# Patient Record
Sex: FEMALE | Employment: OTHER | URBAN - METROPOLITAN AREA
[De-identification: names, ages, dates, MRNs, and addresses within clinical notes are randomized per-mention and may not be internally consistent; named-entity substitution may affect disease eponyms.]

---

## 2023-12-22 ENCOUNTER — APPOINTMENT (OUTPATIENT)
Dept: RADIOLOGY | Facility: HOSPITAL | Age: 73
End: 2023-12-22

## 2023-12-22 ENCOUNTER — HOSPITAL ENCOUNTER (OUTPATIENT)
Facility: HOSPITAL | Age: 73
Setting detail: OBSERVATION
Discharge: HOME | End: 2023-12-23
Attending: STUDENT IN AN ORGANIZED HEALTH CARE EDUCATION/TRAINING PROGRAM | Admitting: INTERNAL MEDICINE

## 2023-12-22 ENCOUNTER — APPOINTMENT (OUTPATIENT)
Dept: CARDIOLOGY | Facility: HOSPITAL | Age: 73
End: 2023-12-22
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
## 2023-12-22 DIAGNOSIS — R55 SYNCOPE, NEAR: ICD-10-CM

## 2023-12-22 DIAGNOSIS — S09.90XA HEAD INJURY, INITIAL ENCOUNTER: ICD-10-CM

## 2023-12-22 DIAGNOSIS — R55 SYNCOPE, UNSPECIFIED SYNCOPE TYPE: ICD-10-CM

## 2023-12-22 LAB
ALBUMIN SERPL-MCNC: 4.2 G/DL (ref 3.5–5)
ALP BLD-CCNC: 67 U/L (ref 35–125)
ALT SERPL-CCNC: 33 U/L (ref 5–40)
ANION GAP SERPL CALC-SCNC: 13 MMOL/L
APPEARANCE UR: CLEAR
AST SERPL-CCNC: 32 U/L (ref 5–40)
ATRIAL RATE: 72 BPM
BACTERIA #/AREA URNS AUTO: ABNORMAL /HPF
BASOPHILS # BLD AUTO: 0.04 X10*3/UL (ref 0–0.1)
BASOPHILS NFR BLD AUTO: 0.6 %
BILIRUB SERPL-MCNC: 0.4 MG/DL (ref 0.1–1.2)
BILIRUB UR STRIP.AUTO-MCNC: NEGATIVE MG/DL
BUN SERPL-MCNC: 37 MG/DL (ref 8–25)
CALCIUM SERPL-MCNC: 10.8 MG/DL (ref 8.5–10.4)
CHLORIDE SERPL-SCNC: 103 MMOL/L (ref 97–107)
CO2 SERPL-SCNC: 25 MMOL/L (ref 24–31)
COLOR UR: ABNORMAL
CREAT SERPL-MCNC: 1.3 MG/DL (ref 0.4–1.6)
EOSINOPHIL # BLD AUTO: 0.12 X10*3/UL (ref 0–0.4)
EOSINOPHIL NFR BLD AUTO: 1.7 %
ERYTHROCYTE [DISTWIDTH] IN BLOOD BY AUTOMATED COUNT: 13.1 % (ref 11.5–14.5)
ETHANOL SERPL-MCNC: <0.01 G/DL
GFR SERPL CREATININE-BSD FRML MDRD: 43 ML/MIN/1.73M*2
GLUCOSE BLD MANUAL STRIP-MCNC: 175 MG/DL (ref 74–99)
GLUCOSE BLD MANUAL STRIP-MCNC: 310 MG/DL (ref 74–99)
GLUCOSE SERPL-MCNC: 231 MG/DL (ref 65–99)
GLUCOSE UR STRIP.AUTO-MCNC: ABNORMAL MG/DL
HCT VFR BLD AUTO: 41.7 % (ref 36–46)
HGB BLD-MCNC: 13.5 G/DL (ref 12–16)
HOLD SPECIMEN: NORMAL
HYALINE CASTS #/AREA URNS AUTO: ABNORMAL /LPF
IMM GRANULOCYTES # BLD AUTO: 0.03 X10*3/UL (ref 0–0.5)
IMM GRANULOCYTES NFR BLD AUTO: 0.4 % (ref 0–0.9)
KETONES UR STRIP.AUTO-MCNC: NEGATIVE MG/DL
LACTATE BLDV-SCNC: 1.9 MMOL/L (ref 0.4–2)
LEUKOCYTE ESTERASE UR QL STRIP.AUTO: NEGATIVE
LYMPHOCYTES # BLD AUTO: 1.99 X10*3/UL (ref 0.8–3)
LYMPHOCYTES NFR BLD AUTO: 28.1 %
MAGNESIUM SERPL-MCNC: 1.9 MG/DL (ref 1.6–3.1)
MCH RBC QN AUTO: 28.2 PG (ref 26–34)
MCHC RBC AUTO-ENTMCNC: 32.4 G/DL (ref 32–36)
MCV RBC AUTO: 87 FL (ref 80–100)
MONOCYTES # BLD AUTO: 0.35 X10*3/UL (ref 0.05–0.8)
MONOCYTES NFR BLD AUTO: 4.9 %
NEUTROPHILS # BLD AUTO: 4.55 X10*3/UL (ref 1.6–5.5)
NEUTROPHILS NFR BLD AUTO: 64.3 %
NITRITE UR QL STRIP.AUTO: NEGATIVE
NRBC BLD-RTO: 0 /100 WBCS (ref 0–0)
P AXIS: 27 DEGREES
P OFFSET: 175 MS
P ONSET: 123 MS
PH UR STRIP.AUTO: 5 [PH]
PLATELET # BLD AUTO: 179 X10*3/UL (ref 150–450)
POTASSIUM SERPL-SCNC: 5.1 MMOL/L (ref 3.4–5.1)
PR INTERVAL: 190 MS
PROT SERPL-MCNC: 6.6 G/DL (ref 5.9–7.9)
PROT UR STRIP.AUTO-MCNC: ABNORMAL MG/DL
Q ONSET: 218 MS
QRS COUNT: 12 BEATS
QRS DURATION: 82 MS
QT INTERVAL: 406 MS
QTC CALCULATION(BAZETT): 444 MS
QTC FREDERICIA: 431 MS
R AXIS: -47 DEGREES
RBC # BLD AUTO: 4.78 X10*6/UL (ref 4–5.2)
RBC # UR STRIP.AUTO: NEGATIVE /UL
RBC #/AREA URNS AUTO: ABNORMAL /HPF
SODIUM SERPL-SCNC: 141 MMOL/L (ref 133–145)
SP GR UR STRIP.AUTO: 1.02
SQUAMOUS #/AREA URNS AUTO: ABNORMAL /HPF
T AXIS: 44 DEGREES
T OFFSET: 421 MS
TROPONIN T SERPL-MCNC: 15 NG/L
TROPONIN T SERPL-MCNC: 16 NG/L
TROPONIN T SERPL-MCNC: 16 NG/L
UROBILINOGEN UR STRIP.AUTO-MCNC: NORMAL MG/DL
VENTRICULAR RATE: 72 BPM
WBC # BLD AUTO: 7.1 X10*3/UL (ref 4.4–11.3)
WBC #/AREA URNS AUTO: ABNORMAL /HPF
WBC CLUMPS #/AREA URNS AUTO: ABNORMAL /HPF

## 2023-12-22 PROCEDURE — 87086 URINE CULTURE/COLONY COUNT: CPT | Mod: WESLAB | Performed by: STUDENT IN AN ORGANIZED HEALTH CARE EDUCATION/TRAINING PROGRAM

## 2023-12-22 PROCEDURE — 99285 EMERGENCY DEPT VISIT HI MDM: CPT | Performed by: STUDENT IN AN ORGANIZED HEALTH CARE EDUCATION/TRAINING PROGRAM

## 2023-12-22 PROCEDURE — 36415 COLL VENOUS BLD VENIPUNCTURE: CPT | Performed by: PHYSICIAN ASSISTANT

## 2023-12-22 PROCEDURE — 85025 COMPLETE CBC W/AUTO DIFF WBC: CPT | Performed by: PHYSICIAN ASSISTANT

## 2023-12-22 PROCEDURE — 83735 ASSAY OF MAGNESIUM: CPT | Performed by: PHYSICIAN ASSISTANT

## 2023-12-22 PROCEDURE — G0378 HOSPITAL OBSERVATION PER HR: HCPCS

## 2023-12-22 PROCEDURE — 82947 ASSAY GLUCOSE BLOOD QUANT: CPT

## 2023-12-22 PROCEDURE — 2500000004 HC RX 250 GENERAL PHARMACY W/ HCPCS (ALT 636 FOR OP/ED): Performed by: PHYSICIAN ASSISTANT

## 2023-12-22 PROCEDURE — 82077 ASSAY SPEC XCP UR&BREATH IA: CPT | Performed by: PHYSICIAN ASSISTANT

## 2023-12-22 PROCEDURE — 84484 ASSAY OF TROPONIN QUANT: CPT | Performed by: PHYSICIAN ASSISTANT

## 2023-12-22 PROCEDURE — 93005 ELECTROCARDIOGRAM TRACING: CPT

## 2023-12-22 PROCEDURE — 72125 CT NECK SPINE W/O DYE: CPT

## 2023-12-22 PROCEDURE — 81001 URINALYSIS AUTO W/SCOPE: CPT | Performed by: STUDENT IN AN ORGANIZED HEALTH CARE EDUCATION/TRAINING PROGRAM

## 2023-12-22 PROCEDURE — 70450 CT HEAD/BRAIN W/O DYE: CPT

## 2023-12-22 PROCEDURE — 83605 ASSAY OF LACTIC ACID: CPT | Performed by: PHYSICIAN ASSISTANT

## 2023-12-22 PROCEDURE — 71046 X-RAY EXAM CHEST 2 VIEWS: CPT

## 2023-12-22 PROCEDURE — 80053 COMPREHEN METABOLIC PANEL: CPT | Performed by: PHYSICIAN ASSISTANT

## 2023-12-22 RX ORDER — CALCIUM CARBONATE 300MG(750)
400 TABLET,CHEWABLE ORAL DAILY
COMMUNITY

## 2023-12-22 RX ORDER — SOLIFENACIN SUCCINATE 10 MG/1
10 TABLET, FILM COATED ORAL DAILY
COMMUNITY

## 2023-12-22 RX ORDER — FENOFIBRATE 48 MG/1
48 TABLET, FILM COATED ORAL DAILY
COMMUNITY

## 2023-12-22 RX ORDER — ONDANSETRON 4 MG/1
4 TABLET, ORALLY DISINTEGRATING ORAL EVERY 8 HOURS PRN
Status: DISCONTINUED | OUTPATIENT
Start: 2023-12-22 | End: 2023-12-23 | Stop reason: HOSPADM

## 2023-12-22 RX ORDER — ACARBOSE 100 MG/1
100 TABLET ORAL
COMMUNITY

## 2023-12-22 RX ORDER — GUAIFENESIN/DEXTROMETHORPHAN 100-10MG/5
5 SYRUP ORAL EVERY 4 HOURS PRN
Status: DISCONTINUED | OUTPATIENT
Start: 2023-12-22 | End: 2023-12-23 | Stop reason: HOSPADM

## 2023-12-22 RX ORDER — ESCITALOPRAM OXALATE 20 MG/1
20 TABLET ORAL DAILY
Status: DISCONTINUED | OUTPATIENT
Start: 2023-12-22 | End: 2023-12-23 | Stop reason: HOSPADM

## 2023-12-22 RX ORDER — DAPAGLIFLOZIN 10 MG/1
10 TABLET, FILM COATED ORAL
COMMUNITY

## 2023-12-22 RX ORDER — METFORMIN HYDROCHLORIDE EXTENDED-RELEASE TABLETS 500 MG/1
500 TABLET, FILM COATED, EXTENDED RELEASE ORAL
COMMUNITY

## 2023-12-22 RX ORDER — ACETAMINOPHEN 650 MG/1
650 SUPPOSITORY RECTAL EVERY 4 HOURS PRN
Status: DISCONTINUED | OUTPATIENT
Start: 2023-12-22 | End: 2023-12-23 | Stop reason: HOSPADM

## 2023-12-22 RX ORDER — LANOLIN ALCOHOL/MO/W.PET/CERES
400 CREAM (GRAM) TOPICAL DAILY
Status: DISCONTINUED | OUTPATIENT
Start: 2023-12-23 | End: 2023-12-23 | Stop reason: HOSPADM

## 2023-12-22 RX ORDER — ONDANSETRON HYDROCHLORIDE 2 MG/ML
4 INJECTION, SOLUTION INTRAVENOUS EVERY 8 HOURS PRN
Status: DISCONTINUED | OUTPATIENT
Start: 2023-12-22 | End: 2023-12-23 | Stop reason: HOSPADM

## 2023-12-22 RX ORDER — POLYETHYLENE GLYCOL 3350 17 G/17G
17 POWDER, FOR SOLUTION ORAL DAILY PRN
Status: DISCONTINUED | OUTPATIENT
Start: 2023-12-22 | End: 2023-12-23 | Stop reason: HOSPADM

## 2023-12-22 RX ORDER — ASPIRIN 81 MG/1
81 TABLET ORAL DAILY
Status: DISCONTINUED | OUTPATIENT
Start: 2023-12-22 | End: 2023-12-23 | Stop reason: HOSPADM

## 2023-12-22 RX ORDER — OXYBUTYNIN CHLORIDE 5 MG/1
5 TABLET ORAL 3 TIMES DAILY
Status: DISCONTINUED | OUTPATIENT
Start: 2023-12-22 | End: 2023-12-23 | Stop reason: HOSPADM

## 2023-12-22 RX ORDER — RAMIPRIL 10 MG/1
10 CAPSULE ORAL DAILY
COMMUNITY

## 2023-12-22 RX ORDER — METFORMIN HYDROCHLORIDE 500 MG/1
500 TABLET ORAL
Status: DISCONTINUED | OUTPATIENT
Start: 2023-12-23 | End: 2023-12-23 | Stop reason: HOSPADM

## 2023-12-22 RX ORDER — DAPAGLIFLOZIN 10 MG/1
10 TABLET, FILM COATED ORAL
Status: DISCONTINUED | OUTPATIENT
Start: 2023-12-23 | End: 2023-12-23 | Stop reason: HOSPADM

## 2023-12-22 RX ORDER — ACETAMINOPHEN 160 MG/5ML
650 SOLUTION ORAL EVERY 4 HOURS PRN
Status: DISCONTINUED | OUTPATIENT
Start: 2023-12-22 | End: 2023-12-23 | Stop reason: HOSPADM

## 2023-12-22 RX ORDER — ASPIRIN 81 MG/1
81 TABLET ORAL DAILY
COMMUNITY

## 2023-12-22 RX ORDER — ROSUVASTATIN CALCIUM 10 MG/1
10 TABLET, COATED ORAL DAILY
COMMUNITY

## 2023-12-22 RX ORDER — GUAIFENESIN 600 MG/1
600 TABLET, EXTENDED RELEASE ORAL EVERY 12 HOURS PRN
Status: DISCONTINUED | OUTPATIENT
Start: 2023-12-22 | End: 2023-12-23 | Stop reason: HOSPADM

## 2023-12-22 RX ORDER — ACETAMINOPHEN 325 MG/1
650 TABLET ORAL EVERY 4 HOURS PRN
Status: DISCONTINUED | OUTPATIENT
Start: 2023-12-22 | End: 2023-12-23 | Stop reason: HOSPADM

## 2023-12-22 RX ORDER — ALLOPURINOL 300 MG/1
300 TABLET ORAL DAILY
Status: DISCONTINUED | OUTPATIENT
Start: 2023-12-23 | End: 2023-12-23 | Stop reason: HOSPADM

## 2023-12-22 RX ORDER — ALLOPURINOL 300 MG/1
300 TABLET ORAL DAILY
COMMUNITY

## 2023-12-22 RX ORDER — ROSUVASTATIN CALCIUM 10 MG/1
10 TABLET, COATED ORAL NIGHTLY
Status: DISCONTINUED | OUTPATIENT
Start: 2023-12-22 | End: 2023-12-23 | Stop reason: HOSPADM

## 2023-12-22 RX ORDER — FENOFIBRATE 54 MG/1
54 TABLET ORAL DAILY
Status: DISCONTINUED | OUTPATIENT
Start: 2023-12-23 | End: 2023-12-23 | Stop reason: HOSPADM

## 2023-12-22 RX ORDER — MIRTAZAPINE 15 MG/1
15 TABLET, ORALLY DISINTEGRATING ORAL NIGHTLY
Status: DISCONTINUED | OUTPATIENT
Start: 2023-12-22 | End: 2023-12-23 | Stop reason: HOSPADM

## 2023-12-22 RX ORDER — HYDROGEN PEROXIDE 3 %
20 SOLUTION, NON-ORAL MISCELLANEOUS
COMMUNITY

## 2023-12-22 RX ORDER — LISINOPRIL 20 MG/1
20 TABLET ORAL DAILY
Status: DISCONTINUED | OUTPATIENT
Start: 2023-12-22 | End: 2023-12-23 | Stop reason: HOSPADM

## 2023-12-22 RX ORDER — ACARBOSE 100 MG/1
100 TABLET ORAL
Status: DISCONTINUED | OUTPATIENT
Start: 2023-12-23 | End: 2023-12-23 | Stop reason: HOSPADM

## 2023-12-22 RX ORDER — MIRTAZAPINE 15 MG/1
15 TABLET, ORALLY DISINTEGRATING ORAL NIGHTLY
COMMUNITY

## 2023-12-22 RX ORDER — ACETAMINOPHEN 500 MG
5 TABLET ORAL NIGHTLY
Status: DISCONTINUED | OUTPATIENT
Start: 2023-12-22 | End: 2023-12-23 | Stop reason: HOSPADM

## 2023-12-22 RX ORDER — PANTOPRAZOLE SODIUM 20 MG/1
20 TABLET, DELAYED RELEASE ORAL
Status: DISCONTINUED | OUTPATIENT
Start: 2023-12-23 | End: 2023-12-23 | Stop reason: HOSPADM

## 2023-12-22 RX ORDER — ESCITALOPRAM OXALATE 20 MG/1
20 TABLET ORAL DAILY
COMMUNITY

## 2023-12-22 RX ADMIN — SODIUM CHLORIDE 500 ML: 900 INJECTION, SOLUTION INTRAVENOUS at 13:08

## 2023-12-22 SDOH — SOCIAL STABILITY: SOCIAL NETWORK
DO YOU BELONG TO ANY CLUBS OR ORGANIZATIONS SUCH AS CHURCH GROUPS UNIONS, FRATERNAL OR ATHLETIC GROUPS, OR SCHOOL GROUPS?: NO

## 2023-12-22 SDOH — SOCIAL STABILITY: SOCIAL INSECURITY: WITHIN THE LAST YEAR, HAVE YOU BEEN HUMILIATED OR EMOTIONALLY ABUSED IN OTHER WAYS BY YOUR PARTNER OR EX-PARTNER?: NO

## 2023-12-22 SDOH — ECONOMIC STABILITY: INCOME INSECURITY: IN THE LAST 12 MONTHS, WAS THERE A TIME WHEN YOU WERE NOT ABLE TO PAY THE MORTGAGE OR RENT ON TIME?: NO

## 2023-12-22 SDOH — HEALTH STABILITY: MENTAL HEALTH: HOW OFTEN DO YOU HAVE 6 OR MORE DRINKS ON ONE OCCASION?: NEVER

## 2023-12-22 SDOH — ECONOMIC STABILITY: TRANSPORTATION INSECURITY
IN THE PAST 12 MONTHS, HAS LACK OF TRANSPORTATION KEPT YOU FROM MEETINGS, WORK, OR FROM GETTING THINGS NEEDED FOR DAILY LIVING?: NO

## 2023-12-22 SDOH — ECONOMIC STABILITY: FOOD INSECURITY: WITHIN THE PAST 12 MONTHS, YOU WORRIED THAT YOUR FOOD WOULD RUN OUT BEFORE YOU GOT MONEY TO BUY MORE.: NEVER TRUE

## 2023-12-22 SDOH — SOCIAL STABILITY: SOCIAL INSECURITY: DOES ANYONE TRY TO KEEP YOU FROM HAVING/CONTACTING OTHER FRIENDS OR DOING THINGS OUTSIDE YOUR HOME?: NO

## 2023-12-22 SDOH — SOCIAL STABILITY: SOCIAL INSECURITY: ARE THERE ANY APPARENT SIGNS OF INJURIES/BEHAVIORS THAT COULD BE RELATED TO ABUSE/NEGLECT?: NO

## 2023-12-22 SDOH — ECONOMIC STABILITY: FOOD INSECURITY: WITHIN THE PAST 12 MONTHS, THE FOOD YOU BOUGHT JUST DIDN'T LAST AND YOU DIDN'T HAVE MONEY TO GET MORE.: NEVER TRUE

## 2023-12-22 SDOH — SOCIAL STABILITY: SOCIAL NETWORK: HOW OFTEN DO YOU ATTENT MEETINGS OF THE CLUB OR ORGANIZATION YOU BELONG TO?: NEVER

## 2023-12-22 SDOH — HEALTH STABILITY: MENTAL HEALTH: HOW OFTEN DO YOU HAVE A DRINK CONTAINING ALCOHOL?: NEVER

## 2023-12-22 SDOH — SOCIAL STABILITY: SOCIAL INSECURITY
WITHIN THE LAST YEAR, HAVE TO BEEN RAPED OR FORCED TO HAVE ANY KIND OF SEXUAL ACTIVITY BY YOUR PARTNER OR EX-PARTNER?: NO

## 2023-12-22 SDOH — SOCIAL STABILITY: SOCIAL INSECURITY: DO YOU FEEL ANYONE HAS EXPLOITED OR TAKEN ADVANTAGE OF YOU FINANCIALLY OR OF YOUR PERSONAL PROPERTY?: NO

## 2023-12-22 SDOH — ECONOMIC STABILITY: HOUSING INSECURITY
IN THE LAST 12 MONTHS, WAS THERE A TIME WHEN YOU DID NOT HAVE A STEADY PLACE TO SLEEP OR SLEPT IN A SHELTER (INCLUDING NOW)?: NO

## 2023-12-22 SDOH — SOCIAL STABILITY: SOCIAL INSECURITY
WITHIN THE LAST YEAR, HAVE YOU BEEN KICKED, HIT, SLAPPED, OR OTHERWISE PHYSICALLY HURT BY YOUR PARTNER OR EX-PARTNER?: NO

## 2023-12-22 SDOH — ECONOMIC STABILITY: INCOME INSECURITY: IN THE PAST 12 MONTHS, HAS THE ELECTRIC, GAS, OIL, OR WATER COMPANY THREATENED TO SHUT OFF SERVICE IN YOUR HOME?: NO

## 2023-12-22 SDOH — SOCIAL STABILITY: SOCIAL NETWORK: ARE YOU MARRIED, WIDOWED, DIVORCED, SEPARATED, NEVER MARRIED, OR LIVING WITH A PARTNER?: DIVORCED

## 2023-12-22 SDOH — SOCIAL STABILITY: SOCIAL INSECURITY: HAS ANYONE EVER THREATENED TO HURT YOUR FAMILY OR YOUR PETS?: NO

## 2023-12-22 SDOH — ECONOMIC STABILITY: INCOME INSECURITY: HOW HARD IS IT FOR YOU TO PAY FOR THE VERY BASICS LIKE FOOD, HOUSING, MEDICAL CARE, AND HEATING?: NOT HARD AT ALL

## 2023-12-22 SDOH — SOCIAL STABILITY: SOCIAL INSECURITY: WITHIN THE LAST YEAR, HAVE YOU BEEN AFRAID OF YOUR PARTNER OR EX-PARTNER?: NO

## 2023-12-22 SDOH — HEALTH STABILITY: PHYSICAL HEALTH: ON AVERAGE, HOW MANY DAYS PER WEEK DO YOU ENGAGE IN MODERATE TO STRENUOUS EXERCISE (LIKE A BRISK WALK)?: 0 DAYS

## 2023-12-22 SDOH — SOCIAL STABILITY: SOCIAL INSECURITY: ARE YOU OR HAVE YOU BEEN THREATENED OR ABUSED PHYSICALLY, EMOTIONALLY, OR SEXUALLY BY ANYONE?: NO

## 2023-12-22 SDOH — HEALTH STABILITY: MENTAL HEALTH
STRESS IS WHEN SOMEONE FEELS TENSE, NERVOUS, ANXIOUS, OR CAN'T SLEEP AT NIGHT BECAUSE THEIR MIND IS TROUBLED. HOW STRESSED ARE YOU?: ONLY A LITTLE

## 2023-12-22 SDOH — ECONOMIC STABILITY: TRANSPORTATION INSECURITY
IN THE PAST 12 MONTHS, HAS THE LACK OF TRANSPORTATION KEPT YOU FROM MEDICAL APPOINTMENTS OR FROM GETTING MEDICATIONS?: NO

## 2023-12-22 SDOH — SOCIAL STABILITY: SOCIAL INSECURITY: WERE YOU ABLE TO COMPLETE ALL THE BEHAVIORAL HEALTH SCREENINGS?: NO

## 2023-12-22 SDOH — ECONOMIC STABILITY: HOUSING INSECURITY: IN THE LAST 12 MONTHS, HOW MANY PLACES HAVE YOU LIVED?: 2

## 2023-12-22 SDOH — HEALTH STABILITY: MENTAL HEALTH: HOW MANY STANDARD DRINKS CONTAINING ALCOHOL DO YOU HAVE ON A TYPICAL DAY?: PATIENT DOES NOT DRINK

## 2023-12-22 SDOH — SOCIAL STABILITY: SOCIAL NETWORK: HOW OFTEN DO YOU GET TOGETHER WITH FRIENDS OR RELATIVES?: MORE THAN THREE TIMES A WEEK

## 2023-12-22 SDOH — SOCIAL STABILITY: SOCIAL NETWORK: HOW OFTEN DO YOU ATTEND CHURCH OR RELIGIOUS SERVICES?: NEVER

## 2023-12-22 SDOH — SOCIAL STABILITY: SOCIAL INSECURITY: DO YOU FEEL UNSAFE GOING BACK TO THE PLACE WHERE YOU ARE LIVING?: NO

## 2023-12-22 SDOH — SOCIAL STABILITY: SOCIAL INSECURITY: ABUSE: ADULT

## 2023-12-22 SDOH — SOCIAL STABILITY: SOCIAL INSECURITY: HAVE YOU HAD THOUGHTS OF HARMING ANYONE ELSE?: NO

## 2023-12-22 SDOH — SOCIAL STABILITY: SOCIAL NETWORK
IN A TYPICAL WEEK, HOW MANY TIMES DO YOU TALK ON THE PHONE WITH FAMILY, FRIENDS, OR NEIGHBORS?: MORE THAN THREE TIMES A WEEK

## 2023-12-22 SDOH — HEALTH STABILITY: PHYSICAL HEALTH: ON AVERAGE, HOW MANY MINUTES DO YOU ENGAGE IN EXERCISE AT THIS LEVEL?: 0 MIN

## 2023-12-22 ASSESSMENT — ACTIVITIES OF DAILY LIVING (ADL)
LACK_OF_TRANSPORTATION: NO
DRESSING YOURSELF: INDEPENDENT
TOILETING: INDEPENDENT
JUDGMENT_ADEQUATE_SAFELY_COMPLETE_DAILY_ACTIVITIES: YES
ADEQUATE_TO_COMPLETE_ADL: YES
HEARING - RIGHT EAR: FUNCTIONAL
BATHING: INDEPENDENT
PATIENT'S MEMORY ADEQUATE TO SAFELY COMPLETE DAILY ACTIVITIES?: NO
HEARING - LEFT EAR: FUNCTIONAL
LACK_OF_TRANSPORTATION: NO
FEEDING YOURSELF: INDEPENDENT
GROOMING: INDEPENDENT
WALKS IN HOME: INDEPENDENT

## 2023-12-22 ASSESSMENT — LIFESTYLE VARIABLES
AUDIT-C TOTAL SCORE: 0
EVER FELT BAD OR GUILTY ABOUT YOUR DRINKING: NO
AUDIT-C TOTAL SCORE: 0
HAVE YOU EVER FELT YOU SHOULD CUT DOWN ON YOUR DRINKING: NO
SUBSTANCE_ABUSE_PAST_12_MONTHS: NO
REASON UNABLE TO ASSESS: NO
HOW MANY STANDARD DRINKS CONTAINING ALCOHOL DO YOU HAVE ON A TYPICAL DAY: PATIENT DOES NOT DRINK
HOW OFTEN DO YOU HAVE A DRINK CONTAINING ALCOHOL: NEVER
EVER HAD A DRINK FIRST THING IN THE MORNING TO STEADY YOUR NERVES TO GET RID OF A HANGOVER: NO
HOW OFTEN DO YOU HAVE 6 OR MORE DRINKS ON ONE OCCASION: NEVER
SKIP TO QUESTIONS 9-10: 1
AUDIT-C TOTAL SCORE: 0
SKIP TO QUESTIONS 9-10: 1
PRESCIPTION_ABUSE_PAST_12_MONTHS: NO
HAVE PEOPLE ANNOYED YOU BY CRITICIZING YOUR DRINKING: NO

## 2023-12-22 ASSESSMENT — COGNITIVE AND FUNCTIONAL STATUS - GENERAL
MOBILITY SCORE: 24
DAILY ACTIVITIY SCORE: 24
PATIENT BASELINE BEDBOUND: NO

## 2023-12-22 ASSESSMENT — PATIENT HEALTH QUESTIONNAIRE - PHQ9
SUM OF ALL RESPONSES TO PHQ9 QUESTIONS 1 & 2: 0
2. FEELING DOWN, DEPRESSED OR HOPELESS: NOT AT ALL
1. LITTLE INTEREST OR PLEASURE IN DOING THINGS: NOT AT ALL

## 2023-12-22 ASSESSMENT — PAIN SCALES - GENERAL
PAINLEVEL_OUTOF10: 0 - NO PAIN

## 2023-12-22 ASSESSMENT — PAIN - FUNCTIONAL ASSESSMENT
PAIN_FUNCTIONAL_ASSESSMENT: 0-10

## 2023-12-22 ASSESSMENT — COLUMBIA-SUICIDE SEVERITY RATING SCALE - C-SSRS
2. HAVE YOU ACTUALLY HAD ANY THOUGHTS OF KILLING YOURSELF?: NO
1. IN THE PAST MONTH, HAVE YOU WISHED YOU WERE DEAD OR WISHED YOU COULD GO TO SLEEP AND NOT WAKE UP?: NO
6. HAVE YOU EVER DONE ANYTHING, STARTED TO DO ANYTHING, OR PREPARED TO DO ANYTHING TO END YOUR LIFE?: NO

## 2023-12-22 NOTE — PROGRESS NOTES
12/22/23 1758   Physical Activity   On average, how many days per week do you engage in moderate to strenuous exercise (like a brisk walk)? 0 days   On average, how many minutes do you engage in exercise at this level? 0 min   Financial Resource Strain   How hard is it for you to pay for the very basics like food, housing, medical care, and heating? Not hard   Housing Stability   In the last 12 months, was there a time when you were not able to pay the mortgage or rent on time? N   In the last 12 months, how many places have you lived? 2   In the last 12 months, was there a time when you did not have a steady place to sleep or slept in a shelter (including now)? N   Transportation Needs   In the past 12 months, has lack of transportation kept you from medical appointments or from getting medications? no   In the past 12 months, has lack of transportation kept you from meetings, work, or from getting things needed for daily living? No   Food Insecurity   Within the past 12 months, you worried that your food would run out before you got the money to buy more. Never true   Within the past 12 months, the food you bought just didn't last and you didn't have money to get more. Never true   Stress   Do you feel stress - tense, restless, nervous, or anxious, or unable to sleep at night because your mind is troubled all the time - these days? Only a littl   Social Connections   In a typical week, how many times do you talk on the phone with family, friends, or neighbors? More than 3   How often do you get together with friends or relatives? More than 3   How often do you attend Mu-ism or Advent services? Never   Do you belong to any clubs or organizations such as Mu-ism groups, unions, fraternal or athletic groups, or school groups? No   How often do you attend meetings of the clubs or organizations you belong to? Never   Are you , , , , never , or living with a partner?     Intimate Partner Violence   Within the last year, have you been afraid of your partner or ex-partner? No   Within the last year, have you been humiliated or emotionally abused in other ways by your partner or ex-partner? No   Within the last year, have you been kicked, hit, slapped, or otherwise physically hurt by your partner or ex-partner? No   Within the last year, have you been raped or forced to have any kind of sexual activity by your partner or ex-partner? No   Alcohol Use   Q1: How often do you have a drink containing alcohol? Never   Q2: How many drinks containing alcohol do you have on a typical day when you are drinking? None   Q3: How often do you have six or more drinks on one occasion? Never   Utilities   In the past 12 months has the electric, gas, oil, or water company threatened to shut off services in your home? No

## 2023-12-22 NOTE — NURSING NOTE
Home med list updated and placed in chart, Dr. Spears secured messaged and asked for orders. Made aware that patient has made it to the floor in 431. Patient axox4, ambulatory, room air. No needs currently. Patient introduced to call bell and light system. Patient educated on safety. Patient agreeable to ring call light when needing to use the restroom due to fall today.

## 2023-12-22 NOTE — PROGRESS NOTES
12/22/23 1803   Current Planned Discharge Disposition   Current Planned Discharge Disposition Home                                        Tory Arcos RN

## 2023-12-22 NOTE — PROGRESS NOTES
12/22/23 1802   [facility] Disability Status   Are you deaf or do you have serious difficulty hearing? N   Are you blind or do you have serious difficulty seeing, even when wearing glasses? N   Because of a physical, mental, or emotional condition, do you have serious difficulty concentrating, remembering, or making decisions? (5 years old or older) N   Do you have serious difficulty walking or climbing stairs? N   Do you have serious difficulty dressing or bathing? N   Because of a physical, mental, or emotional condition, do you have serious difficulty doing errands alone such as visiting the doctor? N

## 2023-12-22 NOTE — CARE PLAN
Pt has a POA and Living Will- not on file; requested documents from pt    ADOD: 2 days    Pt moved here from [place] recently, she lives 3 houses away from her dtr and grandchildren  She is  [employer], retired . She does not drink or smoke, she does not use Home o2, no cpap or bipap. No cane or walker.  She is independent with ADL's and she has been babysitting her grandchildren  She wears glasses and she recently lost her hearing aids.  She does not yet have a physician here, and no pharmacy  Denies hx of depression or anxiety  She is here for a syncopal episode. No anticipated needs.    DISCHARGE PLAN---NO ANTICIPATED NEEDS

## 2023-12-22 NOTE — ED PROVIDER NOTES
HPI   Chief Complaint   Patient presents with    Fall     Pt was standing, fell and hit head. Pt believes she had LOC. Pt denies blood thinners       73-year-old female with a history of diabetes presenting to emergency department with a chief complaint of a syncopal event.  She states she was standing upright next and she remembers is waking up on the ground.  She did strike her head.  There is reported 10-second loss of consciousness.  Her daughter states that she slurred her words for about a minute after she woke up.  There is no reported seizure activity.  There is no seizure history.  There is no alcohol use reported.  There is no preceding lightheadedness dizziness chest pain or shortness of breath.                          Cliff Coma Scale Score: 15                  Patient History   Past Medical History:   Diagnosis Date    Diabetes mellitus (CMS/HCC)      Past Surgical History:   Procedure Laterality Date    CHOLECYSTECTOMY       No family history on file.  Social History     Tobacco Use    Smoking status: Former     Types: Cigarettes    Smokeless tobacco: Not on file   Substance Use Topics    Alcohol use: Never    Drug use: Never       Physical Exam   ED Triage Vitals [12/22/23 1040]   Temp Heart Rate Resp BP   36.4 °C (97.5 °F) 74 18 136/73      SpO2 Temp Source Heart Rate Source Patient Position   99 % Temporal -- --      BP Location FiO2 (%)     -- --       Physical Exam  Vitals and nursing note reviewed.   Constitutional:       Appearance: Normal appearance.   HENT:      Head: Normocephalic and atraumatic.      Nose: Nose normal.      Mouth/Throat:      Mouth: Mucous membranes are moist.   Cardiovascular:      Rate and Rhythm: Normal rate and regular rhythm.   Pulmonary:      Effort: Pulmonary effort is normal.      Breath sounds: Normal breath sounds.   Abdominal:      General: Abdomen is flat.      Palpations: Abdomen is soft.   Musculoskeletal:         General: Normal range of motion.       Cervical back: Normal range of motion.   Skin:     General: Skin is warm and dry.   Neurological:      General: No focal deficit present.      Mental Status: She is alert.      Comments: No acute focal deficits, 5/5 strength in upper and lower extremities, sensation intact in upper and lower extremities, cranial nerves II through XII intact, ambulatory   Psychiatric:         Mood and Affect: Mood normal.         ED Course & MDM   ED Course as of 12/22/23 1505   Fri Dec 22, 2023   1423 EKG presented to me at 2:23 PM     EKG as interpreted by me shows normal sinus rhythm at a rate of 72 bpm, left axis deviation, no ST changes suggest STEMI, normal intervals.   [DH]      ED Course User Index  [DH] Naren Slade MD         Diagnoses as of 12/22/23 1505   Fall, initial encounter   Head injury, initial encounter   Syncope, unspecified syncope type       Medical Decision Making  I have seen and evaluated this patient.  The attending physician has also seen and evaluated this patient.  Vital signs, laboratory testing and diagnostic images if applicable have been reviewed.  All laboratory and imaging is interpreted by myself unless otherwise stated.  Radiology studies are also formally interpreted by radiologist.    CBC without significant leukocytosis or anemia, metabolic panel without significant renal impairment or electrolyte abnormality.  Troponin within an appropriate range without significant delta change, chest x-ray without actionable cardiopulmonary process, EKG without evidence of acute ischemia.  CT brain acutely negative.  Patient with concerning story without clear etiology of patient's syncope.  Will admit for further treatment and management for further evaluation.    Labs Reviewed  COMPREHENSIVE METABOLIC PANEL - Abnormal     Glucose                       231 (*)                Sodium                        141                    Potassium                     5.1                    Chloride                       103                    Bicarbonate                   25                     Urea Nitrogen                 37 (*)                 Creatinine                    1.30                   eGFR                          43 (*)                 Calcium                       10.8 (*)               Albumin                       4.2                    Alkaline Phosphatase          67                     Total Protein                 6.6                    AST                           32                     Bilirubin, Total              0.4                    ALT                           33                     Anion Gap                     13                  SERIAL TROPONIN, INITIAL ([facility]) - Abnormal     Troponin T, High Sensitivity   16 (*)              URINALYSIS WITH REFLEX CULTURE AND MICROSCOPIC - Abnormal     Color, Urine                                         Appearance, Urine             Clear                  Specific Gravity, Urine       1.025                  pH, Urine                     5.0                    Protein, Urine                10 (TRACE)                Glucose, Urine                OVER (4+) (*)               Blood, Urine                  NEGATIVE                Ketones, Urine                NEGATIVE                Bilirubin, Urine              NEGATIVE                Urobilinogen, Urine           Normal                 Nitrite, Urine                NEGATIVE                Leukocyte Esterase, Urine     NEGATIVE             BLOOD GAS LACTIC ACID, VENOUS - Normal     POCT Lactate, Venous          1.9                 ALCOHOL - Normal     Alcohol                       <0.010              MAGNESIUM - Normal     Magnesium                     1.90                SERIAL TROPONIN,  2 HOUR ([facility]) - Normal     Troponin T, High Sensitivity   15                  CBC WITH AUTO DIFFERENTIAL     WBC                           7.1                    nRBC                          0.0                    RBC                            4.78                   Hemoglobin                    13.5                   Hematocrit                    41.7                   MCV                           87                     MCH                           28.2                   MCHC                          32.4                   RDW                           13.1                   Platelets                     179                    Neutrophils %                 64.3                   Immature Granulocytes %, Automated   0.4                    Lymphocytes %                 28.1                   Monocytes %                   4.9                    Eosinophils %                 1.7                    Basophils %                   0.6                    Neutrophils Absolute          4.55                   Immature Granulocytes Absolute, Au*   0.03                   Lymphocytes Absolute          1.99                   Monocytes Absolute            0.35                   Eosinophils Absolute          0.12                   Basophils Absolute            0.04                TROPONIN T SERIES, HIGH SENSITIVITY (0, 2 HR, 6 HR)         Narrative: The following orders were created for panel order Troponin T Series, High Sensitivity (0, 2HR, 6HR).                  Procedure                               Abnormality         Status                                     ---------                               -----------         ------                                     Serial Troponin, Initial...[[id number]]  Abnormal            Final result                               Serial Troponin, 2 Hour ...[[id number]]  Normal              Final result                               Serial Troponin, 6 Hour ...[[id number]]                                                                                   Please view results for these tests on the individual orders.  PROLACTIN  URINALYSIS WITH REFLEX CULTURE AND MICROSCOPIC         Narrative: The following orders were created  for panel order Urinalysis with Reflex Culture and Microscopic.                  Procedure                               Abnormality         Status                                     ---------                               -----------         ------                                     Urinalysis with Reflex C...[[id number]]  Abnormal            Final result                               Extra Urine Gray Tube[[id number]]                            In process                                                   Please view results for these tests on the individual orders.  EXTRA URINE GRAY TUBE  SERIAL TROPONIN, 6 HOUR ([facility])  URINALYSIS MICROSCOPIC WITH REFLEX CULTURE  CT head wo IV contrast   Final Result    No CT evidence for acute intracranial pathology.          Right parietal scalp hematoma, no underlying abnormality is seen.          Signed by: Darren Crowe 12/22/2023 2:14 PM    Dictation workstation:   [id number]     CT cervical spine wo IV contrast   Final Result    1. No acute fracture or spondylolisthesis.    2. Degenerative disc disease and spondylosis as described in the body    of the report.                Signed by: Darren Crowe 12/22/2023 2:49 PM    Dictation workstation:   [id number]     XR chest 2 views   Final Result    No acute cardiopulmonary process.          MACRO:    None          Signed by: Jesus Greer 12/22/2023 12:23 PM    Dictation workstation:   [id number]             ED Supervising Attending Note & Attestation   I was the Supervising Physician. I have seen face to face and examined the patient; reviewed history and physical, performed a substantive portion of the encounter, and discussed the medical decision making with the Medical Student, Resident, Fellow, PA and/or NP. I agree with the assessment and plan as presented unless otherwise documented as follows:     73-year-old female with past medical history of hypertension and diabetes presenting to the emergency room with  syncopal event.  In the last few days patient has been noting seeing some lightheadedness after bending forward and standing up but today had a syncopal episode with no associated bending over.  Patient was standing and suddenly passed out and daughter at home also heard her fall.  When patient came to daughter noticed some slurring of her speech and some moderate confusion but generally returned back to baseline fairly quickly.  Patient broke her glucometer recently and otherwise complains of a fungal infection that has been going on for some time with urinary frequency.  She otherwise denies any significant fevers, chills, nausea, vomiting, diarrhea, constipation, shortness of breath, chest pain.    Vital signs reviewed: Afebrile, 74 bpm, normotensive, 99% on room air    Exam     Constitutional: No acute distress. Resting comfortably.   Head: Normocephalic, atraumatic.   Eyes: Pupils equal bilaterally, EOM grossly intact, conjunctiva normal.  Mouth/Throat: Oropharynx is clear, moist mucus membranes.   Neck: Supple. No lymphadenopathy.  Cardiovascular: Regular rate and regular rhythm. Extremities are well-perfused.   Pulmonary/Chest: No respiratory distress, breathing comfortably on room air.    Abdominal: Soft, non-tender, non-distended. No rebound or guarding.   Musculoskeletal: No lower extremity edema.       Skin: Warm, dry, and intact.   Neurological: Cranial nerves II through XII grossly intact, strength 5 out of 5 in the upper and lower extremities with intact sensation bilaterally and symmetric.  No dysdiadochokinesia.  Ambulatory with a steady gait.  Patient is oriented to person, place, time, and situation. Face symmetric, hearing intact to voice, speech normal. Moves all extremities.   Psych: Mood, affect, thought content, and judgment normal.    Differential includes but is not limited to:  Cardiogenic syncope versus vasovagal syncope versus urinary tract infection versus dehydration versus hypoglycemia  versus viral illness versus pneumonia    Amount and/or Complexity of Data Reviewed  External Data Reviewed: notes.      Labs: ordered.  Labs Reviewed   SERIAL TROPONIN, INITIAL ([facility]) - Abnormal       Result Value    Troponin T, High Sensitivity 16 (*)    BLOOD GAS LACTIC ACID, VENOUS - Normal    POCT Lactate, Venous 1.9     ALCOHOL - Normal    Alcohol <0.010     MAGNESIUM - Normal    Magnesium 1.90     CBC WITH AUTO DIFFERENTIAL    WBC 7.1      nRBC 0.0      RBC 4.78      Hemoglobin 13.5      Hematocrit 41.7      MCV 87      MCH 28.2      MCHC 32.4      RDW 13.1      Platelets 179      Neutrophils % 64.3      Immature Granulocytes %, Automated 0.4      Lymphocytes % 28.1      Monocytes % 4.9      Eosinophils % 1.7      Basophils % 0.6      Neutrophils Absolute 4.55      Immature Granulocytes Absolute, Automated 0.03      Lymphocytes Absolute 1.99      Monocytes Absolute 0.35      Eosinophils Absolute 0.12      Basophils Absolute 0.04     COMPREHENSIVE METABOLIC PANEL   TROPONIN T SERIES, HIGH SENSITIVITY (0, 2 HR, 6 HR)    Narrative:     The following orders were created for panel order Troponin T Series, High Sensitivity (0, 2HR, 6HR).  Procedure                               Abnormality         Status                     ---------                               -----------         ------                     Serial Troponin, Initial...[[id number]]  Abnormal            Final result               Serial Troponin, 2 Hour ...[[id number]]                                                   Please view results for these tests on the individual orders.   PROLACTIN   SERIAL TROPONIN,  2 HOUR ([facility])   URINALYSIS WITH REFLEX CULTURE AND MICROSCOPIC    Narrative:     The following orders were created for panel order Urinalysis with Reflex Culture and Microscopic.  Procedure                               Abnormality         Status                     ---------                               -----------         ------                      Urinalysis with Reflex C...[[id number]]                      In process                 Extra Urine Gray Tube[[id number]]                            In process                   Please view results for these tests on the individual orders.   URINALYSIS WITH REFLEX CULTURE AND MICROSCOPIC   EXTRA URINE GRAY TUBE       Radiology: ordered and independent interpretation performed.  Chest x-ray as interpreted by me shows no large pneumothorax at this time.    ECG/medicine tests: ordered and independent interpretation performed.  ED Course as of 01/19/24 1904   Fri Dec 22, 2023   1423 EKG presented to me at 2:23 PM     EKG as interpreted by me shows normal sinus rhythm at a rate of 72 bpm, left axis deviation, no ST changes suggest STEMI, normal intervals.   [DH]      ED Course User Index  [DH] Naren Slade MD         Diagnoses as of 01/19/24 1904   Fall, initial encounter   Head injury, initial encounter   Syncope, unspecified syncope type     Lab work as interpreted by me shows no significant leukocytosis or anemia on CBC and CMP otherwise shows minor hyperglycemia and slight hypercalcemia but otherwise no significant electrolyte abnormality or DENNIS, urinalysis shows evidence of urinary tract infection but negative for nitrates negative for leukocyte Estrace and will not treat at this time, likely dirty catch.  CT imaging including head and cervical spine at this time with no acute findings.    Despite appropriate lab work at this time patient with concerning story for syncope with concern for cardiogenic syncope.  Patient on monitor here in the emergency room with no captured arrhythmia but will require admission to telemetry for further monitoring and evaluation.    Discussion of management or test interpretation with external provider(s):  Patient presentation discussed with hospitalist medicine and after discussion of findings and history will admit at this time for further telemetry monitoring.  PATIENT REFERRED  TO:  No follow-up provider specified.        DISCHARGE MEDICATIONS:  New Prescriptions    No medications on file       Naren Slade MD  12:40 PM    Attending Emergency Physician  [facility] EMERGENCY MEDICINE          Procedure  Procedures     Zia Park PA-C  12/22/23 1505       Zia Park PA-C  12/22/23 1505       Naren Slade MD  01/19/24 5044

## 2023-12-22 NOTE — PROGRESS NOTES
12/22/23 1801   Discharge Planning   Living Arrangements Alone   Support Systems Children   Type of Residence Private residence   Number of Stairs to Enter Residence 3   Number of Stairs Within Residence 0   Do you have animals or pets at home? No   Who is requesting discharge planning? Provider   Home or Post Acute Services None   Patient expects to be discharged to: Home   Does the patient need discharge transport arranged? No   Financial Resource Strain   How hard is it for you to pay for the very basics like food, housing, medical care, and heating? Not hard   Housing Stability   In the last 12 months, was there a time when you were not able to pay the mortgage or rent on time? N   In the last 12 months, how many places have you lived? 2   In the last 12 months, was there a time when you did not have a steady place to sleep or slept in a shelter (including now)? N   Transportation Needs   In the past 12 months, has lack of transportation kept you from medical appointments or from getting medications? no   In the past 12 months, has lack of transportation kept you from meetings, work, or from getting things needed for daily living? No   Patient Choice   Patient / Family choosing to utilize agency / facility established prior to hospitalization No

## 2023-12-23 ENCOUNTER — APPOINTMENT (OUTPATIENT)
Dept: CARDIOLOGY | Facility: HOSPITAL | Age: 73
End: 2023-12-23

## 2023-12-23 VITALS
RESPIRATION RATE: 16 BRPM | DIASTOLIC BLOOD PRESSURE: 56 MMHG | TEMPERATURE: 98.2 F | WEIGHT: 148 LBS | HEIGHT: 66 IN | HEART RATE: 66 BPM | OXYGEN SATURATION: 97 % | SYSTOLIC BLOOD PRESSURE: 124 MMHG | BODY MASS INDEX: 23.78 KG/M2

## 2023-12-23 LAB
ALBUMIN SERPL-MCNC: 3.7 G/DL (ref 3.5–5)
ALP BLD-CCNC: 55 U/L (ref 35–125)
ALT SERPL-CCNC: 23 U/L (ref 5–40)
ANION GAP SERPL CALC-SCNC: 11 MMOL/L
APPEARANCE UR: CLEAR
AST SERPL-CCNC: 19 U/L (ref 5–40)
BILIRUB SERPL-MCNC: 0.4 MG/DL (ref 0.1–1.2)
BILIRUB UR STRIP.AUTO-MCNC: NEGATIVE MG/DL
BUN SERPL-MCNC: 39 MG/DL (ref 8–25)
CALCIUM SERPL-MCNC: 10.5 MG/DL (ref 8.5–10.4)
CHLORIDE SERPL-SCNC: 107 MMOL/L (ref 97–107)
CHOLEST SERPL-MCNC: 117 MG/DL (ref 133–200)
CHOLEST/HDLC SERPL: 3.3 {RATIO}
CO2 SERPL-SCNC: 22 MMOL/L (ref 24–31)
COLOR UR: ABNORMAL
CREAT SERPL-MCNC: 1.5 MG/DL (ref 0.4–1.6)
ERYTHROCYTE [DISTWIDTH] IN BLOOD BY AUTOMATED COUNT: 13.2 % (ref 11.5–14.5)
GFR SERPL CREATININE-BSD FRML MDRD: 36 ML/MIN/1.73M*2
GLUCOSE BLD MANUAL STRIP-MCNC: 191 MG/DL (ref 74–99)
GLUCOSE BLD MANUAL STRIP-MCNC: 214 MG/DL (ref 74–99)
GLUCOSE SERPL-MCNC: 219 MG/DL (ref 65–99)
GLUCOSE UR STRIP.AUTO-MCNC: ABNORMAL MG/DL
HCT VFR BLD AUTO: 38.8 % (ref 36–46)
HDLC SERPL-MCNC: 35 MG/DL
HGB BLD-MCNC: 12.3 G/DL (ref 12–16)
KETONES UR STRIP.AUTO-MCNC: NEGATIVE MG/DL
LDLC SERPL CALC-MCNC: 51 MG/DL (ref 65–130)
LEUKOCYTE ESTERASE UR QL STRIP.AUTO: NEGATIVE
MCH RBC QN AUTO: 27.6 PG (ref 26–34)
MCHC RBC AUTO-ENTMCNC: 31.7 G/DL (ref 32–36)
MCV RBC AUTO: 87 FL (ref 80–100)
NITRITE UR QL STRIP.AUTO: NEGATIVE
NRBC BLD-RTO: 0 /100 WBCS (ref 0–0)
PH UR STRIP.AUTO: 5 [PH]
PLATELET # BLD AUTO: 160 X10*3/UL (ref 150–450)
POTASSIUM SERPL-SCNC: 4.6 MMOL/L (ref 3.4–5.1)
PROT SERPL-MCNC: 6.1 G/DL (ref 5.9–7.9)
PROT UR STRIP.AUTO-MCNC: NEGATIVE MG/DL
RBC # BLD AUTO: 4.45 X10*6/UL (ref 4–5.2)
RBC # UR STRIP.AUTO: NEGATIVE /UL
SODIUM SERPL-SCNC: 140 MMOL/L (ref 133–145)
SP GR UR STRIP.AUTO: 1.02
TRIGL SERPL-MCNC: 157 MG/DL (ref 40–150)
TSH SERPL DL<=0.05 MIU/L-ACNC: 0.65 MIU/L (ref 0.27–4.2)
UROBILINOGEN UR STRIP.AUTO-MCNC: NORMAL MG/DL
WBC # BLD AUTO: 6.6 X10*3/UL (ref 4.4–11.3)

## 2023-12-23 PROCEDURE — 80053 COMPREHEN METABOLIC PANEL: CPT | Performed by: INTERNAL MEDICINE

## 2023-12-23 PROCEDURE — 82947 ASSAY GLUCOSE BLOOD QUANT: CPT

## 2023-12-23 PROCEDURE — 87086 URINE CULTURE/COLONY COUNT: CPT | Mod: WESLAB | Performed by: INTERNAL MEDICINE

## 2023-12-23 PROCEDURE — 84443 ASSAY THYROID STIM HORMONE: CPT | Performed by: INTERNAL MEDICINE

## 2023-12-23 PROCEDURE — 81003 URINALYSIS AUTO W/O SCOPE: CPT | Performed by: INTERNAL MEDICINE

## 2023-12-23 PROCEDURE — 93306 TTE W/DOPPLER COMPLETE: CPT | Performed by: INTERNAL MEDICINE

## 2023-12-23 PROCEDURE — 2500000001 HC RX 250 WO HCPCS SELF ADMINISTERED DRUGS (ALT 637 FOR MEDICARE OP): Performed by: INTERNAL MEDICINE

## 2023-12-23 PROCEDURE — 36415 COLL VENOUS BLD VENIPUNCTURE: CPT | Performed by: INTERNAL MEDICINE

## 2023-12-23 PROCEDURE — 97161 PT EVAL LOW COMPLEX 20 MIN: CPT | Mod: GP | Performed by: PHYSICAL THERAPIST

## 2023-12-23 PROCEDURE — 2500000004 HC RX 250 GENERAL PHARMACY W/ HCPCS (ALT 636 FOR OP/ED): Performed by: INTERNAL MEDICINE

## 2023-12-23 PROCEDURE — 80061 LIPID PANEL: CPT | Performed by: INTERNAL MEDICINE

## 2023-12-23 PROCEDURE — 85027 COMPLETE CBC AUTOMATED: CPT | Performed by: INTERNAL MEDICINE

## 2023-12-23 PROCEDURE — 84478 ASSAY OF TRIGLYCERIDES: CPT | Performed by: INTERNAL MEDICINE

## 2023-12-23 PROCEDURE — 2500000002 HC RX 250 W HCPCS SELF ADMINISTERED DRUGS (ALT 637 FOR MEDICARE OP, ALT 636 FOR OP/ED): Performed by: INTERNAL MEDICINE

## 2023-12-23 PROCEDURE — G0378 HOSPITAL OBSERVATION PER HR: HCPCS

## 2023-12-23 PROCEDURE — 93306 TTE W/DOPPLER COMPLETE: CPT

## 2023-12-23 RX ADMIN — OXYBUTYNIN CHLORIDE 5 MG: 5 TABLET ORAL at 08:57

## 2023-12-23 RX ADMIN — ASPIRIN 81 MG: 81 TABLET, COATED ORAL at 08:59

## 2023-12-23 RX ADMIN — DAPAGLIFLOZIN 10 MG: 10 TABLET, FILM COATED ORAL at 08:59

## 2023-12-23 RX ADMIN — PANTOPRAZOLE SODIUM 20 MG: 20 TABLET, DELAYED RELEASE ORAL at 06:26

## 2023-12-23 RX ADMIN — ALLOPURINOL 300 MG: 300 TABLET ORAL at 08:57

## 2023-12-23 RX ADMIN — ESCITALOPRAM OXALATE 20 MG: 20 TABLET ORAL at 08:58

## 2023-12-23 RX ADMIN — LISINOPRIL 20 MG: 20 TABLET ORAL at 11:33

## 2023-12-23 RX ADMIN — Medication 400 MG: at 08:58

## 2023-12-23 RX ADMIN — METFORMIN HYDROCHLORIDE 500 MG: 500 TABLET, FILM COATED ORAL at 08:57

## 2023-12-23 ASSESSMENT — COGNITIVE AND FUNCTIONAL STATUS - GENERAL
MOBILITY SCORE: 24
DAILY ACTIVITIY SCORE: 24
MOBILITY SCORE: 24

## 2023-12-23 ASSESSMENT — PAIN - FUNCTIONAL ASSESSMENT: PAIN_FUNCTIONAL_ASSESSMENT: 0-10

## 2023-12-23 ASSESSMENT — PAIN SCALES - GENERAL
PAINLEVEL_OUTOF10: 0 - NO PAIN
PAINLEVEL_OUTOF10: 0 - NO PAIN

## 2023-12-23 NOTE — CONSULTS
Neurology consultation   Date: 12/23/2023   Provider: Prateek Concepcion MD     Reason For Consult:  S syncopal episode    History Of Present Illness  Lori Beard is a 73 y.o. female who is visiting from [place] and was admitted yesterday with syncopal event at home.  Patient does not recall what happened but thinks she may had might have gotten up from a seated position on the couch to go to the bathroom when she apparently fell to the floor backward hitting the back of her head against the floor losing consciousness.  Her daughter was next-door heard the thud and came to help her and there was a very brief loss of consciousness without description of any convulsive movement or jerking of tremor was prescribed and no postictal confusion prolonged drowsiness incontinence or tongue biting she denied any body aches except for the pain in the occipital area.  Helping her daughter in the last few days with how sick shoulders has been very busy and reports a couple of episodes of brief lightheadedness after bending forward and standing up but yesterday had a syncopal episode.    Patient is feeling fine now sitting at the edge of the bed anxious  to go home  She has been mobile in the room walking to the bathroom without difficulty dizziness or fainting    Head and C-spine CT scan in the emergency room were negative for acute pathology  .  Past Medical History  She has a past medical history of Diabetes mellitus (CMS/[facility]).  And hypertension  Depression insomnia and current urinary tract infection  Gout  Surgical History  She has a past surgical history that includes Cholecystectomy.     Social History  She reports that she has quit smoking. Her smoking use included cigarettes. She does not have any smokeless tobacco history on file. She reports that she does not drink alcohol and does not use drugs.  Lives in [place]    Family History  No family history on file.     Allergies  Patient has no known allergies.    Review  "of Systems  She otherwise denies any significant fevers, chills, nausea, vomiting, diarrhea, constipation, shortness of breath, chest pain.      No prior history of seizures or convulsions or syncope or TIA or stroke  Physical Exam  Awake alert and oriented no acute distress pleasant elderly female.  No dysarthria or aphasia   Normal cognition and comprehension denies any chest pain or dizziness or vertigo or headache she does have some tenderness in the occipital area with palpation.  Cranial nerves examination within normal limits: Normal visual fields no facial asymmetry tongue midline.  Motor examination shows no drift or weakness muscle power 5 out of 5 bilaterally in the upper and lower extremities.  Sensory examination grossly intact  Some decrease in the reflexes of the ankles  Negative Romberg sign.  No ataxia.  Normal gait.  Neck auscultation reveals no carotid bruits  Regular S1-S2  Lungs good air exchange bilaterally.    Last Recorded Vitals  Blood pressure 141/53, pulse 70, temperature 37.5 °C (99.5 °F), temperature source Oral, resp. rate 16, height 1.676 m (5' 6\"), weight 67.1 kg (148 lb), SpO2 92 %.    Relevant Results  C-spine CT scan:  FINDINGS:  There is a normal cervical lordosis.  No acute fracture or  spondylolisthesis is identified. Mild facet degenerative change and  uncovertebral joint degenerative changes.     The occipital condyles, arch of C1, and the odontoid processes are  intact. The atlantoaxial relationship is well maintained.      Mild-moderate disc space narrowing at C3-4. Moderate-severe disc  space narrowing at C6-7 with vacuum disc phenomena and diffuse disc  bulging. There is mild degenerative change and disc bulging at the  remainder of the cervical levels. No evidence for high-grade bony  spinal canal stenosis. Multilevel mild-moderate canal stenosis.      There is moderate-severe left neural foramina stenosis and mild right  neural foramina stenosis at C3-4. Moderate " bilateral neural foramina  stenosis at C4-5. Moderate-severe bilateral neural foramina stenosis  at C5-6.     IMPRESSION:  1. No acute fracture or spondylolisthesis.  2. Degenerative disc disease and spondylosis as described in the body  of the report.      Head CT scan without contrast:  FINDINGS:  Right parietal scalp hematoma. No underlying bony abnormality. No  focal mass effect or midline shift is identified. The ventricles and  sulci are symmetric and appropriate for the patient's age.      The gray white matter differentiation is preserved.      No acute intracranial hemorrhage is seen. No intra-axial or  extra-axial fluid collection is seen.      The visualized paranasal sinuses and mastoid air cells are clear.      IMPRESSION:  No CT evidence for acute intracranial pathology.      Right parietal scalp hematoma, no underlying abnormality is seen.        EKG presented to me at 2:23 PM      EKG as interpreted by me shows normal sinus rhythm at a rate of 72 bpm, left axis deviation, no ST changes suggest STEMI, normal intervals.       Assessment/Plan     Syncopal episode: Likely vasovagal  Diabetes mellitus  Hypertension  Hyperlipidemia    There is no indication in her history and current neurological examination of convulsion, postictal phase, TIA or stroke or current neurological deficit.    In review of the normal examination and negative workup I think it is reasonable to discharge the patient home today for the care of his family on same home medication including baby aspirin every day.  He is advised to return to the emergency room for any ongoing or recurrence of dizziness or vertigo or new neurological complaints of the syncope.      I spent 45 minutes in the professional and overall care of this patient.  Coding face-to-face time for interview and examination review of medical file and current investigations including review of CT scan images, labs vital signs and medical file.      Prateek Concepcion  MD  Neurology  [phone]

## 2023-12-23 NOTE — H&P
History Of Present Illness  Lori Beard is a 73 y.o. female presenting with fall and head injury.  Patient said that since she got up from the couch and went to the bathroom.  She was standing and fell backwards.  She hit the back of her head on the floor.  She was out for 10 seconds.  Her daughter was at home working on the computer and heard her fall.  As per daughter patient had dislodged before admitted.  There was no reported seizure activity.  No focal weakness or numbness.  No nausea or vomiting.  No chest pain, palpitation, shortness of breath, cough, expectoration, diarrhea, dysuria, hematuria frequency.  No hematemesis currently.  No melena.  In the emergency room initial workup was done and patient has been admitted to the floor for further management.     Past Medical History  Past Medical History:   Diagnosis Date    Diabetes mellitus (CMS/[facility])    Hypertension, depression, GERD, diabetes mellitus, gout, urinary incontinence and recurrent UTI    Surgical History  Past Surgical History:   Procedure Laterality Date    CHOLECYSTECTOMY     Shoulder surgery     Social History  She reports that she has quit smoking. Her smoking use included cigarettes. She does not have any smokeless tobacco history on file. She reports that she does not drink alcohol and does not use drugs.    Family History  Mother had diabetes mellitus and father had MI.     Allergies  Patient has no known allergies.    Review of Systems  I reviewed all systems reviewed as above otherwise is negative.  Physical Exam  HEENT:  Head externally atraumatic, no pallor, no icterus, extraocular movements intact, pupils reactive to light, oral mucosa moist and throat clear.  Neck:  Supple, no JVP, no palpable adenopathy or thyromegaly.  No carotid bruit.  Chest:  Clear to auscultation and resonant.  Heart:  Regular rate and rhythm, no murmur or gallop could be appreciated.  Abdomen:  Soft, nontender, bowel sounds present, normoactive, no  "palpable hepatosplenomegaly.  Extremities:  No edema, pulses present, no cyanosis or clubbing.  CNS:  Patient alert, oriented to time, place and person.  Power 5/5 all over and deep tendon reflexes symmetrical, cranial nerves 2-12 grossly intact.  Skin:  No active rash.  Musculoskeletal:  No joint swelling or erythema, range of movement normal.  Last Recorded Vitals  Heart Rate:  [70-80]   Temp:  [36.5 °C (97.7 °F)-37.5 °C (99.5 °F)]   Resp:  [16-17]   BP: (109-183)/(48-79)   Height:  [167.6 cm (5' 6\")]   Weight:  [67.1 kg (148 lb)]   SpO2:  [92 %-98 %]       Relevant Results        Results for orders placed or performed during the hospital encounter of 12/22/23 (from the past 24 hour(s))   CBC and Auto Differential   Result Value Ref Range    WBC 7.1 4.4 - 11.3 x10*3/uL    nRBC 0.0 0.0 - 0.0 /100 WBCs    RBC 4.78 4.00 - 5.20 x10*6/uL    Hemoglobin 13.5 12.0 - 16.0 g/dL    Hematocrit 41.7 36.0 - 46.0 %    MCV 87 80 - 100 fL    MCH 28.2 26.0 - 34.0 pg    MCHC 32.4 32.0 - 36.0 g/dL    RDW 13.1 11.5 - 14.5 %    Platelets 179 150 - 450 x10*3/uL    Neutrophils % 64.3 40.0 - 80.0 %    Immature Granulocytes %, Automated 0.4 0.0 - 0.9 %    Lymphocytes % 28.1 13.0 - 44.0 %    Monocytes % 4.9 2.0 - 10.0 %    Eosinophils % 1.7 0.0 - 6.0 %    Basophils % 0.6 0.0 - 2.0 %    Neutrophils Absolute 4.55 1.60 - 5.50 x10*3/uL    Immature Granulocytes Absolute, Automated 0.03 0.00 - 0.50 x10*3/uL    Lymphocytes Absolute 1.99 0.80 - 3.00 x10*3/uL    Monocytes Absolute 0.35 0.05 - 0.80 x10*3/uL    Eosinophils Absolute 0.12 0.00 - 0.40 x10*3/uL    Basophils Absolute 0.04 0.00 - 0.10 x10*3/uL   Comprehensive metabolic panel   Result Value Ref Range    Glucose 231 (H) 65 - 99 mg/dL    Sodium 141 133 - 145 mmol/L    Potassium 5.1 3.4 - 5.1 mmol/L    Chloride 103 97 - 107 mmol/L    Bicarbonate 25 24 - 31 mmol/L    Urea Nitrogen 37 (H) 8 - 25 mg/dL    Creatinine 1.30 0.40 - 1.60 mg/dL    eGFR 43 (L) >60 mL/min/1.73m*2    Calcium 10.8 (H) 8.5 - " 10.4 mg/dL    Albumin 4.2 3.5 - 5.0 g/dL    Alkaline Phosphatase 67 35 - 125 U/L    Total Protein 6.6 5.9 - 7.9 g/dL    AST 32 5 - 40 U/L    Bilirubin, Total 0.4 0.1 - 1.2 mg/dL    ALT 33 5 - 40 U/L    Anion Gap 13 <=19 mmol/L   Blood Gas Lactic Acid, Venous   Result Value Ref Range    POCT Lactate, Venous 1.9 0.4 - 2.0 mmol/L   Ethanol   Result Value Ref Range    Alcohol <0.010 0.000 - 0.010 g/dL   Magnesium   Result Value Ref Range    Magnesium 1.90 1.60 - 3.10 mg/dL   Serial Troponin, Initial ([facility])   Result Value Ref Range    Troponin T, High Sensitivity 16 (H) <=15 ng/L   Urinalysis with Reflex Culture and Microscopic   Result Value Ref Range    Color, Urine Light-Yellow Light-Yellow, Yellow, Dark-Yellow    Appearance, Urine Clear Clear    Specific Gravity, Urine 1.025 1.005 - 1.035    pH, Urine 5.0 5.0, 5.5, 6.0, 6.5, 7.0, 7.5, 8.0    Protein, Urine 10 (TRACE) NEGATIVE, 10 (TRACE), 20 (TRACE) mg/dL    Glucose, Urine OVER (4+) (A) Normal mg/dL    Blood, Urine NEGATIVE NEGATIVE    Ketones, Urine NEGATIVE NEGATIVE mg/dL    Bilirubin, Urine NEGATIVE NEGATIVE    Urobilinogen, Urine Normal Normal mg/dL    Nitrite, Urine NEGATIVE NEGATIVE    Leukocyte Esterase, Urine NEGATIVE NEGATIVE   Extra Urine Gray Tube   Result Value Ref Range    Extra Tube Hold for add-ons.    Urinalysis Microscopic   Result Value Ref Range    WBC, Urine 21-50 (A) 1-5, NONE /HPF    WBC Clumps, Urine OCCASIONAL Reference range not established. /HPF    RBC, Urine 1-2 NONE, 1-2, 3-5 /HPF    Squamous Epithelial Cells, Urine 1-9 (SPARSE) Reference range not established. /HPF    Bacteria, Urine 1+ (A) NONE SEEN /HPF    Hyaline Casts, Urine 1+ (A) NONE /LPF   ECG 12 lead   Result Value Ref Range    Ventricular Rate 72 BPM    Atrial Rate 72 BPM    MI Interval 190 ms    QRS Duration 82 ms    QT Interval 406 ms    QTC Calculation(Bazett) 444 ms    P Axis 27 degrees    R Axis -47 degrees    T Axis 44 degrees    QRS Count 12 beats    Q Onset 218 ms    P  Onset 123 ms    P Offset 175 ms    T Offset 421 ms    QTC Fredericia 431 ms   Serial Troponin, 2 Hour ([facility])   Result Value Ref Range    Troponin T, High Sensitivity 15 <=15 ng/L   POCT GLUCOSE   Result Value Ref Range    POCT Glucose 175 (H) 74 - 99 mg/dL   Serial Troponin, 6 Hour ([facility])   Result Value Ref Range    Troponin T, High Sensitivity 16 (H) <=15 ng/L   POCT GLUCOSE   Result Value Ref Range    POCT Glucose 310 (H) 74 - 99 mg/dL   Urinalysis with Reflex Microscopic   Result Value Ref Range    Color, Urine Light-Yellow Light-Yellow, Yellow, Dark-Yellow    Appearance, Urine Clear Clear    Specific Gravity, Urine 1.025 1.005 - 1.035    pH, Urine 5.0 5.0, 5.5, 6.0, 6.5, 7.0, 7.5, 8.0    Protein, Urine NEGATIVE NEGATIVE, 10 (TRACE), 20 (TRACE) mg/dL    Glucose, Urine OVER (4+) (A) Normal mg/dL    Blood, Urine NEGATIVE NEGATIVE    Ketones, Urine NEGATIVE NEGATIVE mg/dL    Bilirubin, Urine NEGATIVE NEGATIVE    Urobilinogen, Urine Normal Normal mg/dL    Nitrite, Urine NEGATIVE NEGATIVE    Leukocyte Esterase, Urine NEGATIVE NEGATIVE   Comprehensive metabolic panel   Result Value Ref Range    Glucose 219 (H) 65 - 99 mg/dL    Sodium 140 133 - 145 mmol/L    Potassium 4.6 3.4 - 5.1 mmol/L    Chloride 107 97 - 107 mmol/L    Bicarbonate 22 (L) 24 - 31 mmol/L    Urea Nitrogen 39 (H) 8 - 25 mg/dL    Creatinine 1.50 0.40 - 1.60 mg/dL    eGFR 36 (L) >60 mL/min/1.73m*2    Calcium 10.5 (H) 8.5 - 10.4 mg/dL    Albumin 3.7 3.5 - 5.0 g/dL    Alkaline Phosphatase 55 35 - 125 U/L    Total Protein 6.1 5.9 - 7.9 g/dL    AST 19 5 - 40 U/L    Bilirubin, Total 0.4 0.1 - 1.2 mg/dL    ALT 23 5 - 40 U/L    Anion Gap 11 <=19 mmol/L   CBC   Result Value Ref Range    WBC 6.6 4.4 - 11.3 x10*3/uL    nRBC 0.0 0.0 - 0.0 /100 WBCs    RBC 4.45 4.00 - 5.20 x10*6/uL    Hemoglobin 12.3 12.0 - 16.0 g/dL    Hematocrit 38.8 36.0 - 46.0 %    MCV 87 80 - 100 fL    MCH 27.6 26.0 - 34.0 pg    MCHC 31.7 (L) 32.0 - 36.0 g/dL    RDW 13.2 11.5 - 14.5 %     Platelets 160 150 - 450 x10*3/uL   Lipid Panel   Result Value Ref Range    Cholesterol 117 (L) 133 - 200 mg/dL    HDL-Cholesterol 35.0 (L) >50.0 mg/dL    Cholesterol/HDL Ratio 3.3 SEE COMMENT    LDL Calculated 51 (L) 65 - 130 mg/dL    Triglycerides 157 (H) 40 - 150 mg/dL   TSH with reflex to Free T4 if abnormal   Result Value Ref Range    Thyroid Stimulating Hormone 0.65 0.27 - 4.20 mIU/L   POCT GLUCOSE   Result Value Ref Range    POCT Glucose 214 (H) 74 - 99 mg/dL   Transthoracic echo (TTE) complete   Result Value Ref Range    BSA 1.77 m2     Prior to Admission medications    Medication Sig Start Date End Date Taking? Authorizing Provider   acarbose (Precose) 100 mg tablet Take 1 tablet (100 mg) by mouth 3 times a day with meals.    Historical Provider, MD   allopurinol (Zyloprim) 300 mg tablet Take 1 tablet (300 mg) by mouth once daily.    Historical Provider, MD   aspirin 81 mg EC tablet Take 1 tablet (81 mg) by mouth once daily.    Historical Provider, MD   dapagliflozin propanediol (Farxiga) 10 mg Take 1 tablet (10 mg) by mouth once every day.    Historical Provider, MD   escitalopram (Lexapro) 20 mg tablet Take 1 tablet (20 mg) by mouth once daily.    Historical Provider, MD   esomeprazole (NexIUM) 20 mg DR capsule Take 1 capsule (20 mg) by mouth once daily in the morning. Take before meals. Do not open capsule.    Historical Provider, MD   fenofibrate (Tricor) 48 mg tablet Take 1 tablet (48 mg) by mouth once daily.    Historical Provider, MD   magnesium oxide (Mag-Ox) 400 mg tablet Take 1 tablet (400 mg) by mouth once daily.    Historical Provider, MD   metFORMIN OSM, (Fortamet) 500 mg 24 hr tablet Take 1 tablet (500 mg) by mouth once daily in the evening. Take with meals. Do not crush, chew, or split.    Historical Provider, MD   mirtazapine (Remeron Sol-Tab) 15 mg disintegrating tablet Take 1 tablet (15 mg) by mouth once daily at bedtime.    Historical Provider, MD   ramipril (Altace) 10 mg capsule Take 1  capsule (10 mg) by mouth once daily.    Historical Provider, MD   rosuvastatin (Crestor) 10 mg tablet Take 1 tablet (10 mg) by mouth once daily.    Historical Provider, MD   solifenacin (VESIcare) 10 mg tablet Take 1 tablet (10 mg) by mouth once daily. Swallow tablet whole; do not crush, chew, or split.    Historical Provider, MD       Current Facility-Administered Medications:     acarbose (Precose) tablet 100 mg, 100 mg, oral, TID with meals, Prudencio Spears MD    acetaminophen (Tylenol) tablet 650 mg, 650 mg, oral, q4h PRN **OR** acetaminophen (Tylenol) oral liquid 650 mg, 650 mg, oral, q4h PRN **OR** acetaminophen (Tylenol) suppository 650 mg, 650 mg, rectal, q4h PRN, Prudencio Spears MD    allopurinol (Zyloprim) tablet 300 mg, 300 mg, oral, Daily, Prudencio Spears MD, 300 mg at 12/23/23 0857    aspirin EC tablet 81 mg, 81 mg, oral, Daily, Prudencio Spears MD, 81 mg at 12/23/23 0859    benzocaine-menthol (Cepastat Sore Throat) 15-3.6 mg lozenge 1 lozenge, 1 lozenge, Mouth/Throat, q2h PRN, Prudencio Spears MD    dapagliflozin propanediol (Farxiga) tablet 10 mg, 10 mg, oral, Daily with breakfast, Prudencio Spears MD, 10 mg at 12/23/23 0859    dextromethorphan-guaifenesin (Robitussin DM)  mg/5 mL oral liquid 5 mL, 5 mL, oral, q4h PRN, Prudencio Spears MD    escitalopram (Lexapro) tablet 20 mg, 20 mg, oral, Daily, Prudencio Spears MD, 20 mg at 12/23/23 0858    fenofibrate (Tricor) tablet 54 mg, 54 mg, oral, Daily, Prudencio Spears MD    guaiFENesin (Mucinex) 12 hr tablet 600 mg, 600 mg, oral, q12h PRN, Prudencio Spears MD    lisinopril tablet 20 mg, 20 mg, oral, Daily, Prudencio Spears MD    magnesium oxide (Mag-Ox) tablet 400 mg, 400 mg, oral, Daily, Prudencio Spears MD, 400 mg at 12/23/23 0858    melatonin tablet 5 mg, 5 mg, oral, Nightly, Prudencio Spears MD    metFORMIN (Glucophage) tablet 500 mg, 500 mg, oral, BID with meals, Prudencio Spears MD, 500 mg at  12/23/23 0857    mirtazapine (Remeron Sol-Tab) disintegrating tablet 15 mg, 15 mg, oral, Nightly, Prudencio Spears MD    ondansetron ODT (Zofran-ODT) disintegrating tablet 4 mg, 4 mg, oral, q8h PRN **OR** ondansetron (Zofran) injection 4 mg, 4 mg, intravenous, q8h PRN, Prudencio Spears MD    oxybutynin (Ditropan) tablet 5 mg, 5 mg, oral, TID, Prudencio Spears MD, 5 mg at 12/23/23 0857    pantoprazole (ProtoNix) EC tablet 20 mg, 20 mg, oral, Daily before breakfast, Prudencio Spears MD, 20 mg at 12/23/23 0626    polyethylene glycol (Glycolax, Miralax) packet 17 g, 17 g, oral, Daily PRN, Prudencio Spears MD    rosuvastatin (Crestor) tablet 10 mg, 10 mg, oral, Nightly, Prudencio Spears MD  CT cervical spine wo IV contrast    Result Date: 12/22/2023  Interpreted By:  Darren Crowe, STUDY: CT CERVICAL SPINE WO IV CONTRAST; 12/22/2023 12:20 pm   INDICATION: Fall, trauma   COMPARISON: None   ACCESSION NUMBER(S): [id number]   ORDERING CLINICIAN: JOSEPH MURPHY   TECHNIQUE: Contiguous axial images were acquired from the skull base to the lung apices. Coronal and sagittal reformatted images were obtained. All CT examinations are performed with 1 or more of the following dose reduction techniques: Automated exposure control, adjustment of mA and/or kv according to patient's size, or use of iterative reconstruction techniques.   FINDINGS: There is a normal cervical lordosis.  No acute fracture or spondylolisthesis is identified. Mild facet degenerative change and uncovertebral joint degenerative changes.     The occipital condyles, arch of C1, and the odontoid processes are intact. The atlantoaxial relationship is well maintained.   Mild-moderate disc space narrowing at C3-4. Moderate-severe disc space narrowing at C6-7 with vacuum disc phenomena and diffuse disc bulging. There is mild degenerative change and disc bulging at the remainder of the cervical levels. No evidence for high-grade bony spinal canal  stenosis. Multilevel mild-moderate canal stenosis.   There is moderate-severe left neural foramina stenosis and mild right neural foramina stenosis at C3-4. Moderate bilateral neural foramina stenosis at C4-5. Moderate-severe bilateral neural foramina stenosis at C5-6.   The visualized lung apices are unremarkable.       1. No acute fracture or spondylolisthesis. 2. Degenerative disc disease and spondylosis as described in the body of the report.     Signed by: Darren Crowe 12/22/2023 2:49 PM Dictation workstation:   [id number]    CT head wo IV contrast    Result Date: 12/22/2023  Interpreted By:  Darren Crowe, STUDY: WING QUINTANILLA; 12/22/2023 12:20 pm   INDICATION: Signs/Symptoms:head injury, fall;   COMPARISON: None   ACCESSION NUMBER(S): [id number]   ORDERING CLINICIAN: WING QUINTANILLA   TECHNIQUE: Contiguous axial images were acquired from the vertex through the posterior fossa without IV contrast. All CT examinations are performed with 1 or more of the following dose reduction techniques: Automated exposure control, adjustment of mA and/or kv according to patient's size, or use of iterative reconstruction techniques.   FINDINGS: Right parietal scalp hematoma. No underlying bony abnormality. No focal mass effect or midline shift is identified. The ventricles and sulci are symmetric and appropriate for the patient's age.   The gray white matter differentiation is preserved.   No acute intracranial hemorrhage is seen. No intra-axial or extra-axial fluid collection is seen.   The visualized paranasal sinuses and mastoid air cells are clear.       No CT evidence for acute intracranial pathology.   Right parietal scalp hematoma, no underlying abnormality is seen.   Signed by: Darren Crowe 12/22/2023 2:14 PM Dictation workstation:   [id number]    ECG 12 lead    Result Date: 12/22/2023  Normal sinus rhythm Left axis deviation Minimal voltage criteria for LVH, may be normal variant Septal infarct , age  undetermined Inferior infarct , age undetermined Abnormal ECG No previous ECGs available Confirmed by Marek John ([id number]) on 12/22/2023 1:18:19 PM    XR chest 2 views    Result Date: 12/22/2023  Interpreted By:  Jesus Greer, STUDY: XR CHEST 2 VIEWS; 12/22/2023 12:01 pm   INDICATION: Signs/Symptoms:fall   COMPARISON: None   ACCESSION NUMBER(S): [id number]   ORDERING CLINICIAN: WING QUINTANILLA   TECHNIQUE: AP and lateral projections   FINDINGS: The cardiomediastinal silhouette is unremarkable. The lungs are clear. No pleural effusion is identified.   The osseous structures are intact.       No acute cardiopulmonary process.   MACRO: None   Signed by: Jesus Greer 12/22/2023 12:23 PM Dictation workstation:   [id number]    No results found for the last 90 days.       Assessment/Plan   Principal Problem:    Syncope, near  Active Problems:    Syncope and collapse  Hypertension  Hyperlipidemia  GERD  Osteoarthritis  Gout  Recurrent UTI    Plan: Continue current medication disability.  Patient had a fall off his CPAP get out of the couch and went to bathroom and was standing.  Most likely vasovagal syncope.  Check orthostatic blood pressure.  Check 2D echo.  Consult cardiology.  Check CT of the head in the morning if okay with neurology to rule out slow bleed.  Will give supportive care.  Will treat DVT, fall, aspiration and decubitus precautions.  This has been discussed with the patient and is agreeable to it.        Prudencio Spears MD

## 2023-12-23 NOTE — NURSING NOTE
Assumed care. Patient report done at bedside. Patient is awake sitting in bed. Call light within reach.

## 2023-12-23 NOTE — CARE PLAN
Problem: Fall/Injury  Goal: Not fall by end of shift  Outcome: Progressing  Goal: Be free from injury by end of the shift  Outcome: Progressing  Goal: Verbalize understanding of personal risk factors for fall in the hospital  Outcome: Progressing  Goal: Verbalize understanding of risk factor reduction measures to prevent injury from fall in the home  Outcome: Progressing  Goal: Use assistive devices by end of the shift  Outcome: Progressing  Goal: Pace activities to prevent fatigue by end of the shift  Outcome: Progressing     Problem: Diabetes  Goal: Achieve decreasing blood glucose levels by end of shift  Outcome: Progressing  Goal: Increase stability of blood glucose readings by end of shift  Outcome: Progressing  Goal: Decrease in ketones present in urine by end of shift  Outcome: Progressing  Goal: Maintain electrolyte levels within acceptable range throughout shift  Outcome: Progressing  Goal: Maintain glucose levels >70mg/dl to <250mg/dl throughout shift  Outcome: Progressing  Goal: No changes in neurological exam by end of shift  Outcome: Progressing  Goal: Learn about and adhere to nutrition recommendations by end of shift  Outcome: Progressing  Goal: Vital signs within normal range for age by end of shift  Outcome: Progressing  Goal: Increase self care and/or family involovement by end of shift  Outcome: Progressing  Goal: Receive DSME education by end of shift  Outcome: Progressing   The patient's goals for the shift include Safety    The clinical goals for the shift include no dizziness, safety

## 2023-12-23 NOTE — PROGRESS NOTES
Occupational Therapy    Evaluation    Patient Name: Lori Beard  MRN: [id number]  Today's Date: 12/23/2023     Plan:  No Skilled OT: Independent with ADLs  OT Discharge Recommendations: No further acute OT  OT - OK to Discharge: Yes     General:  General  Reason for Referral: Syncope  Referred By: Dr. Spears  General Comment: Screen only. Patient is ambulating around room and does not require skilled OT evaluation.

## 2023-12-23 NOTE — NURSING NOTE
Did not administer any medication for patient.  Patient took all of her medication prior to hospitalization. She will take them in the morning.

## 2023-12-23 NOTE — NURSING NOTE
0800 Pt resting, sitting at edge of bed; pt feels good; expected to be discharged today. Neuro signed off.     154 PM   Pt d/deep to home with daughter.

## 2023-12-23 NOTE — PROGRESS NOTES
Patient with active discharge order, per previous care coordinator patient is planned discharge home with no needs    DC plan secure

## 2023-12-23 NOTE — PROGRESS NOTES
Physical Therapy    Physical Therapy Evaluation    Patient Name: Lori Beard  MRN: [id number]  Today's Date: 12/23/2023   Time Calculation  Start Time: 0909  Stop Time: 0925  Time Calculation (min): 16 min    Assessment/Plan   PT Assessment  End of Session Patient Position: Alarm off, not on at start of session (seated EOB; call light and needs within reach)  IP OR SWING BED PT PLAN  Inpatient or Swing Bed: Inpatient  PT Plan  PT Plan: PT Eval only  PT Eval Only Reason: At baseline function  PT Discharge Recommendations: No PT needed after discharge  PT - OK to Discharge: Yes    Subjective   General Visit Information:  General  Reason for Referral: impaired functional mobility. This 73 year old femlae presented to ED from home after syncope and collapse. She was admitted for syncope.  Past Medical History Relevant to Rehab: DM, HTN  Family/Caregiver Present: No  Prior to Session Communication: Bedside nurse (RN cleared pt for participation.)  Patient Position Received:  (seated EOB; alarm off)  General Comment: Pt agreed to session and participated fully.    Home Living:  Home Living  Type of Home: House  Lives With: Alone  Home Adaptive Equipment: None  Home Layout: One level  Home Access: Stairs to enter with rails  Entrance Stairs-Rails:  (unilateral)  Entrance Stairs-Number of Steps: 3  Bathroom Shower/Tub: Walk-in shower  Bathroom Equipment: None  Bathroom Accessibility: 2 stairs without rail  Home Living Comments: dtr lives nearby    Prior Level of Function:  Prior Function Per Pt/Caregiver Report  Level of [place]: Independent with ADLs and functional transfers, Independent with homemaking with ambulation  Ambulatory Assistance:  (indepednent, including stairs. Denied any other recent falls.)  Prior Function Comments: (+) driving    Precautions:  Precautions  Medical Precautions: Fall precautions    Objective   Pain:  Pain Assessment  Pain Assessment: 0-10  Pain Score: 0 - No  pain  Cognition:  Cognition  Overall Cognitive Status: Within Functional Limits (A&O x4)    General Assessments:  Activity Tolerance  Endurance: Endurance does not limit participation in activity    Sensation  Light Touch: Not tested  Sensation Comment: denied paresthesias    Strength  Strength Comments: BLE >/=3+/5 based on observation of functional mobility    Perception  Motor Planning: Appears intact    Coordination  Movements are Fluid and Coordinated: Yes    Postural Control  Postural Control: Within Functional Limits    Functional Assessments:  Transfers  Transfer: Yes  Transfer 1  Technique 1: Sit to stand, Stand to sit  Transfer Device 1:  (EOB)  Transfer Level of Assistance 1: Modified independent  Trials/Comments 1: x1 trial    Ambulation/Gait Training  Ambulation/Gait Training Performed: Yes  Ambulation/Gait Training 1  Surface 1: Level tile  Device 1: No device  Assistance 1: Independent  Comments/Distance (ft) 1: 150 ft usind reciprocal pattern. Steady gait.    Extremity/Trunk Assessments:  RLE   RLE : Within Functional Limits  LLE   LLE : Within Functional Limits    Outcome Measures:  [facility] Basic Mobility  Turning from your back to your side while in a flat bed without using bedrails: None  Moving from lying on your back to sitting on the side of a flat bed without using bedrails: None  Moving to and from bed to chair (including a wheelchair): None  Standing up from a chair using your arms (e.g. wheelchair or bedside chair): None  To walk in hospital room: None  Climbing 3-5 steps with railing: None  Basic Mobility - Total Score: 24        Education Documentation  No documentation found.  Education Comments  No comments found.

## 2023-12-25 LAB — BACTERIA UR CULT: ABNORMAL

## 2023-12-26 LAB
AORTIC VALVE PEAK VELOCITY: 1.23
AV PEAK GRADIENT: 6.1
AVA (PEAK VEL): 2.89
BACTERIA UR CULT: ABNORMAL
EJECTION FRACTION APICAL 4 CHAMBER: 54.1
EJECTION FRACTION: 60
LEFT VENTRICULAR OUTFLOW TRACT DIAMETER: 2
MITRAL VALVE E/A RATIO: 0.62
MITRAL VALVE E/E' RATIO: 4.98
RIGHT VENTRICLE FREE WALL PEAK S': 7.94
RIGHT VENTRICLE PEAK SYSTOLIC PRESSURE: 16.8
TRICUSPID ANNULAR PLANE SYSTOLIC EXCURSION: 1.8

## 2024-02-01 NOTE — DISCHARGE SUMMARY
Discharge Diagnosis  Syncope, near    Issues Requiring Follow-Up    Syncope    Discharge Meds     Your medication list        CONTINUE taking these medications        Instructions Last Dose Given Next Dose Due   acarbose 100 mg tablet  Commonly known as: Precose           allopurinol 300 mg tablet  Commonly known as: Zyloprim           aspirin 81 mg EC tablet           dapagliflozin propanediol 10 mg  Commonly known as: Farxiga           escitalopram 20 mg tablet  Commonly known as: Lexapro           esomeprazole 20 mg DR capsule  Commonly known as: NexIUM           fenofibrate 48 mg tablet  Commonly known as: Tricor           magnesium oxide 400 mg tablet  Commonly known as: Mag-Ox           metFORMIN (OSM) 500 mg 24 hr tablet  Commonly known as: Fortamet           mirtazapine 15 mg disintegrating tablet  Commonly known as: Remeron Sol-Tab           ramipril 10 mg capsule  Commonly known as: Altace           rosuvastatin 10 mg tablet  Commonly known as: Crestor           solifenacin 10 mg tablet  Commonly known as: VESIcare                    Test Results Pending At Discharge  Pending Labs       No current pending labs.            Hospital Course    The patient was admitted with a complaint of syncopal episode  Closed head injury.  Most likely patient had vasovagal syncope.  Patient is stable during her stay in the hospital.  Patient was discharged home in a stable condition.    Pertinent Physical Exam At Time of Discharge  Physical Exam  HEENT:  Head externally atraumatic, no pallor, no icterus, extraocular movements intact, pupils reactive to light, oral mucosa moist and throat clear.  Neck:  Supple, no JVP, no palpable adenopathy or thyromegaly.  No carotid bruit.  Chest:  Clear to auscultation and resonant.  Heart:  Regular rate and rhythm, no murmur or gallop could be appreciated.  Abdomen:  Soft, nontender, bowel sounds present, normoactive, no palpable hepatosplenomegaly.  Extremities:  No edema, pulses  present, no cyanosis or clubbing.  CNS:  Patient alert, oriented to time, place and person.  Power 5/5 all over and deep tendon reflexes symmetrical, cranial nerves 2-12 grossly intact.  Skin:  No active rash.  Musculoskeletal:  No joint swelling or erythema, range of movement normal.  Outpatient Follow-Up  No future appointments.      Prudencio Spears MD